# Patient Record
Sex: FEMALE | Race: WHITE
[De-identification: names, ages, dates, MRNs, and addresses within clinical notes are randomized per-mention and may not be internally consistent; named-entity substitution may affect disease eponyms.]

---

## 2018-03-30 ENCOUNTER — HOSPITAL ENCOUNTER (OUTPATIENT)
Dept: HOSPITAL 62 - LC | Age: 33
Discharge: HOME | End: 2018-03-30
Attending: OBSTETRICS & GYNECOLOGY
Payer: MEDICAID

## 2018-03-30 DIAGNOSIS — Z34.93: Primary | ICD-10-CM

## 2018-03-30 PROCEDURE — 4A1HXCZ MONITORING OF PRODUCTS OF CONCEPTION, CARDIAC RATE, EXTERNAL APPROACH: ICD-10-PCS | Performed by: OBSTETRICS & GYNECOLOGY

## 2018-03-30 PROCEDURE — 59025 FETAL NON-STRESS TEST: CPT

## 2018-03-30 NOTE — NON STRESS TEST REPORT
=================================================================

Non Stress Test

=================================================================

Datetime Report Generated by CPN: 03/30/2018 15:37

   

   

=================================================================

DEMOGRAPHIC

=================================================================

   

EGA NST:  40.3

   

=================================================================

INDICATION

=================================================================

   

Indication for Study:  Ordered by Provider

   

=================================================================

MONITORING

=================================================================

   

Monitor Explained:  Monitor Explained; Test Explained; Patient

   Verbalized Understanding

Time on Monitor:  03/30/2018 15:07

Time off Monitor:  03/30/2018 15:27

NST Duration:  20

   

=================================================================

NST INTERVENTIONS

=================================================================

   

NST Interventions:  None

Physician Notified NST:  Dr Infante

BABY A:  [id number]

   

=================================================================

BABY A

=================================================================

   

Fetal Movement :  Present

Contraction Frequency :  0

FHR Baseline :  130

Accelerations :  15X15

Decelerations :  None

Variability :  Moderate 6-25bpm

NST Review:  Meets Criteria for Reactive NST

NST Review and Verified By :  SERGO Aviles RN

NSVERÓNICA Results:  Reactive

   

=================================================================

NST REPORT

=================================================================

   

Report Trigger:  Send Report

## 2018-04-03 ENCOUNTER — HOSPITAL ENCOUNTER (INPATIENT)
Dept: HOSPITAL 62 - LR | Age: 33
LOS: 2 days | Discharge: HOME | End: 2018-04-05
Attending: OBSTETRICS & GYNECOLOGY | Admitting: OBSTETRICS & GYNECOLOGY
Payer: MEDICAID

## 2018-04-03 DIAGNOSIS — Z88.2: ICD-10-CM

## 2018-04-03 DIAGNOSIS — Z3A.41: ICD-10-CM

## 2018-04-03 DIAGNOSIS — O26.03: ICD-10-CM

## 2018-04-03 DIAGNOSIS — F32.9: ICD-10-CM

## 2018-04-03 DIAGNOSIS — O48.0: Primary | ICD-10-CM

## 2018-04-03 LAB
ADD MANUAL DIFF: NO
APPEARANCE UR: (no result)
APTT PPP: YELLOW S
BARBITURATES UR QL SCN: NEGATIVE
BASOPHILS # BLD AUTO: 0 10^3/UL (ref 0–0.2)
BASOPHILS NFR BLD AUTO: 0.5 % (ref 0–2)
BILIRUB UR QL STRIP: NEGATIVE
EOSINOPHIL # BLD AUTO: 0.2 10^3/UL (ref 0–0.6)
EOSINOPHIL NFR BLD AUTO: 2.9 % (ref 0–6)
ERYTHROCYTE [DISTWIDTH] IN BLOOD BY AUTOMATED COUNT: 14.4 % (ref 11.5–14)
GLUCOSE UR STRIP-MCNC: NEGATIVE MG/DL
HCT VFR BLD CALC: 34.1 % (ref 36–47)
HGB BLD-MCNC: 11.4 G/DL (ref 12–15.5)
KETONES UR STRIP-MCNC: NEGATIVE MG/DL
LYMPHOCYTES # BLD AUTO: 1.7 10^3/UL (ref 0.5–4.7)
LYMPHOCYTES NFR BLD AUTO: 21.8 % (ref 13–45)
MCH RBC QN AUTO: 29.3 PG (ref 27–33.4)
MCHC RBC AUTO-ENTMCNC: 33.5 G/DL (ref 32–36)
MCV RBC AUTO: 87 FL (ref 80–97)
METHADONE UR QL SCN: NEGATIVE
MONOCYTES # BLD AUTO: 0.7 10^3/UL (ref 0.1–1.4)
MONOCYTES NFR BLD AUTO: 8.6 % (ref 3–13)
NEUTROPHILS # BLD AUTO: 5.3 10^3/UL (ref 1.7–8.2)
NEUTS SEG NFR BLD AUTO: 66.2 % (ref 42–78)
NITRITE UR QL STRIP: NEGATIVE
PCP UR QL SCN: NEGATIVE
PH UR STRIP: 6 [PH] (ref 5–9)
PLATELET # BLD: 224 10^3/UL (ref 150–450)
PROT UR STRIP-MCNC: NEGATIVE MG/DL
RBC # BLD AUTO: 3.9 10^6/UL (ref 3.72–5.28)
SP GR UR STRIP: 1.01
TOTAL CELLS COUNTED % (AUTO): 100 %
URINE AMPHETAMINES SCREEN: NEGATIVE
URINE BENZODIAZEPINES SCREEN: NEGATIVE
URINE COCAINE SCREEN: NEGATIVE
URINE MARIJUANA (THC) SCREEN: NEGATIVE
UROBILINOGEN UR-MCNC: NEGATIVE MG/DL (ref ?–2)
WBC # BLD AUTO: 8 10^3/UL (ref 4–10.5)

## 2018-04-03 PROCEDURE — 85027 COMPLETE CBC AUTOMATED: CPT

## 2018-04-03 PROCEDURE — S0119 ONDANSETRON 4 MG: HCPCS

## 2018-04-03 PROCEDURE — 4A1HXCZ MONITORING OF PRODUCTS OF CONCEPTION, CARDIAC RATE, EXTERNAL APPROACH: ICD-10-PCS | Performed by: OBSTETRICS & GYNECOLOGY

## 2018-04-03 PROCEDURE — 86850 RBC ANTIBODY SCREEN: CPT

## 2018-04-03 PROCEDURE — 86900 BLOOD TYPING SEROLOGIC ABO: CPT

## 2018-04-03 PROCEDURE — 80307 DRUG TEST PRSMV CHEM ANLYZR: CPT

## 2018-04-03 PROCEDURE — 0HQ9XZZ REPAIR PERINEUM SKIN, EXTERNAL APPROACH: ICD-10-PCS | Performed by: OBSTETRICS & GYNECOLOGY

## 2018-04-03 PROCEDURE — 85025 COMPLETE CBC W/AUTO DIFF WBC: CPT

## 2018-04-03 PROCEDURE — 86592 SYPHILIS TEST NON-TREP QUAL: CPT

## 2018-04-03 PROCEDURE — 81005 URINALYSIS: CPT

## 2018-04-03 PROCEDURE — 36415 COLL VENOUS BLD VENIPUNCTURE: CPT

## 2018-04-03 PROCEDURE — 86901 BLOOD TYPING SEROLOGIC RH(D): CPT

## 2018-04-03 RX ADMIN — SODIUM CHLORIDE, SODIUM LACTATE, POTASSIUM CHLORIDE, AND CALCIUM CHLORIDE PRN ML: .6; .31; .03; .02 INJECTION, SOLUTION INTRAVENOUS at 06:54

## 2018-04-03 RX ADMIN — SODIUM CHLORIDE, SODIUM LACTATE, POTASSIUM CHLORIDE, AND CALCIUM CHLORIDE PRN ML: .6; .31; .03; .02 INJECTION, SOLUTION INTRAVENOUS at 16:53

## 2018-04-03 NOTE — ADMISSION PHYSICAL
=================================================================



=================================================================

Datetime Report Generated by CPN: 2018 07:49

   

   

=================================================================

CURRENT ADMISSION

=================================================================

   

Prenatal Hx Assessment:  The Prenatal History has been Reviewed and is

   Current

Chief Complaint:  Scheduled Induction of Labor

Indication for Induction:  Maternal Diabetes

Admit Impression :  Term, Intrauterine Pregnancy; Induction of Labor

Admit Plan:  Admit to Unit; Initiate Labor Protocol

   

=================================================================

ALLERGIES

=================================================================

   

Medication Allergies:  Yes

Medication Allergies:  Sulfa (Sulfonamide Antibiotics)/MO/Urticaria

   (2018)

Latex:  No Latex Allergies

Food Allergies:  N/A

Environmental Allergies:  N/A

   

=================================================================

OBSTETRICAL HISTORY

=================================================================

   

EDC:  2018 00:00

:  1

Para:  0

Term:  0

:  0

SAB:  0

IAB:  0

Ectopic:  0

Livin

Cesareans:  0

VBACs:  0

Multiple Births:  0

Gestational Diabetes:  No

Rh Sensitization:  No

Incompetent Cervix:  No

ALESSIA:  No

Infertility:  No

ART Treatment:  No

Uterine Anomaly:  No

IUGR:  No

Hx Previous C/S:  No

Macrosomia:  No

Hx Loss/Stillborn:  No

PIH:  No

Hx  Death:  No

Placenta Previa/Abruption:  No

Depression/PP Depression:  No

PTL/PROM:  No

Post Partum Hemorrhage:  No

Current Pregnancy Procedures:  Ultrasound; NST

Obstetrical History Comments:  G1- Current

   

=================================================================

***SEE PRENATAL RECORDS***

=================================================================

   

Alcohol:  No

Marijuana :  No

Cocaine:  No

Other Illicit Drugs:  No

Cigarettes:  Former Smoker. [id number]

   

=================================================================

MEDICAL HISTORY

=================================================================

   

Diabetes:  No

Blood Transfusion:  No

Pulmonary Disease (Asthma, TB):  No

Breast Disease:  No

Hypertension:  No

Gyn Surgery:  No

Heart Disease:  No

Hosp/Surgery:  No

Autoimmune Disorder:  No

Anesthetic Complications:  No

Kidney Disease:  No

Abnormal Pap Smear:  No

Neuro/Epilepsy:  No

Psychiatric Disorders:  Yes

Other Medical Diseases:  No

Hepatitis/Liver Disease:  No

Significant Family History:  No

Varicosities/Phlebitis:  No

Trauma/Violence :  No

Thyroid Dysfunction:  No

Medical History Comments:  Depression, social anxiety

   

=================================================================

INFECTIOUS HISTORY

=================================================================

   

Gonorrhea:  No

Genital Herpes:  No

Chlamydia:  No

Tuberculosis:  No

Syphilis:  No

Hepatitis:  No

HIV/AIDS Exposure:  No

Rash or Viral Illness:  No

HPV:  No

   

=================================================================

PHYSICAL EXAM

=================================================================

   

General:  Normal

HEENT:  Normal

Neurologic:  Normal

Thyroid:  Normal

Heart:  Normal

Lungs:  Normal

Breast:  Normal

Back:  Normal

Abdomen:  Normal

Genitourinary Exam:  Normal

Extremities:  Normal

DTRs:  Normal

Pelvic Type:  Adequate

Vital Signs:  Reviewed; Within Normal Limits

   

=================================================================

VAGINAL EXAM

=================================================================

   

Dilatation:  2

Effacement:  50

Station:  -3

   

=================================================================

MEMBRANES

=================================================================

   

Membranes:  Intact

   

=================================================================

FETUS A

=================================================================

   

EGA:  41.0

Monitoring:  External US

FHR- Baseline:  140

Variability:  Moderate 6-25bpm

Accelerations:  15X15

Decelerations:  None

FHR Category:  Category I

Fetal Presentation:  Vertex

   

=================================================================

PLANS FOR LABOR AND DELIVERY

=================================================================

   

Labor and Delivery:  None

Pain Management:  Epidural

Feeding Preference:  Breast

Benefit of Breast Feed Discussed:  Yes

Circumcision:  Yes

   

=================================================================

INFORMED CONSENT

=================================================================

   

Informed Consent Obtained:  Vaginal Delivery;  Section

   Delivery; Induction of Labor; Vacuum/Forceps Assist; Risks, Benefits

   and Alternatives Discussed

Signature:  Electronically signed by Magdaleno Nuno MD ([facility]) on

   4/3/2018 at 07:48  with User ID: [email or web address]

## 2018-04-03 NOTE — DELIVERY SUMMARY
=================================================================

Del Sum A-C

=================================================================

Datetime Report Generated by CPN: 2018 22:57

   

   

=================================================================

DELIVERY PERSONNEL

=================================================================

   

DELIVERY PERSONNEL:  [id number]

Delivery Doctor::  Anna Marie Jessica MD

Labor and Delivery Nurse::  Lucía Small RN

Labor and Delivery Nurse::  Amelia Anaya RN

Nursery Nurse::  Renate Daniel RN

Nursery Nurse::  TRU Sanchezub Cuate/CNA:  Herlinda Castle, ST

   

=================================================================

MATERNAL INFORMATION

=================================================================

   

Delivery Anesthesia:  Epidural

Medications After Delivery:  Pitocin Bolus-Please Comment; Pitocin Drip

   20 Units/1000ml NSS

Estimated  Blood Loss (ml):  150

Maternal Complications:  None

   

=================================================================

LABOR SUMMARY

=================================================================

   

EDC:  2018 00:00

No. Babies in Womb:  1

 Attempted:  No

Labor Anesthesia:  Epidural

   

=================================================================

LABOR INFORMATION

=================================================================

   

Reason for Induction:  Post Dates

Onset of Labor:  2018 09:30

Complete Dilatation:  2018 20:37

Oxytocin:  Induction

Group B Beta Strep:  Positive

Antibiotics # of Doses:  3

Antibiotics Time of Last Dose:  171

Name of Antibiotic Given:  PENICILLIN G

Steroids Given:  None

Reason Steroids Not Administered:  Not Applicable

   

=================================================================

MEMBRANES

=================================================================

   

Membranes Rupture Method:  Artificial

Rupture of Membranes:  2018 09:30

Length of Rupture (hr):  11.55

Amniotic Fluid Color:  Clear

Amniotic Fluid Amount:  Small

Amniotic Fluid Odor:  None

   

=================================================================

STAGES OF LABOR

=================================================================

   

Stage 1 hr:  11

Stage 1 min:  7

Stage 2 hr:  0

Stage 2 min:  26

Stage 3 hr:  0

Stage 3 min:  5

Total Time in Labor hr:  11

Total Time in Labor min:  38

   

=================================================================

VAGINAL DELIVERY

=================================================================

   

Episiotomy:  None

Laceration #1:  Perineal

Laceration Extension #1:  First Degree

Laceration Repair:  Yes

Laceration Repair Note:  2-0 chromic repair in normal fashion.

Sponge Count Correct:  N/A

Sharps Count Correct:  N/A

   

=================================================================

CSECTION DELIVERY

=================================================================

   

Primary Indication:  N/A

Secondary Indication:  N/A

CSection Incidence:  N/A

Labor:  N/A

Elective:  N/A

CSection Incision:  N/A

   

=================================================================

BABY A INFORMATION

=================================================================

   

Infant Delivery Date/Time:  2018 21:03

Method of Delivery:  Vaginal

Born in Route :  No

:  N/A

Forceps:  N/A

Vacuum Extraction:  N/A

Shoulder Dystocia :  No

   

=================================================================

PRESENTATION/POSITION BABY A

=================================================================

   

Presentation:  Cephalic

Cephalic Presentation:  Vertex

Vertex Position:  Left Occipital Anterior

Breech Presentation:  N/A

   

=================================================================

PLACENTA INFORMATION BABY A

=================================================================

   

Placenta Delivery Time :  2018 21:08

Placenta Method of Delivery:  Spontaneous

Placenta Status:  Delivered

   

=================================================================

APGAR SCORES BABY A

=================================================================

   

Heart Rate 1 min:  >100 bpm

Resp Effort 1 min:  Good Cry

Reflex Irritability 1 min:  Cough or Sneeze or Pulls Away

Muscle Tone 1 min:  Active Motion

Color 1 min:  Blue/Pale

APGAR SCORE 1 MIN:  8

Heart Rate 5 min:  >100 bpm

Resp Effort 5 min:  Good Cry

Reflex Irritability 5 min:  Cough or Sneeze or Pulls Away

Muscle Tone 5 min:  Active Motion

Color 5 min:  Body Pink, Extremities Blue

APGAR SCORE 5 MIN:  9

   

=================================================================

INFANT INFORMATION BABY A

=================================================================

   

Gestational Age at Delivery:  41.0

Gestational Status:  Late Term-  41- 41.6 Weeks

Infant Outcome :  Liveborn

Infant Condition :  Stable

Infant Sex:  Male

   

=================================================================

IDENTIFICATION BABY A

=================================================================

   

Infant Verification Date/Time:  2018 21:21

ID Band Number:  [id number]

Mother's Name Verified:  Yes

Infant Medical Record Number:  [id number]

RN Verifying Infant:  K Jaclyn RN

Additional Verifying Personnel:  D Isabela US

   

=================================================================

WEIGHT/LENGTH BABY A

=================================================================

   

Infant Birthweight (gm):  4110

Infant Weight (lb):  9

Infant Weight (oz):  1

Infant Length (in):  20.50

Infant Length (cm):  52.07

   

=================================================================

CORD INFORMATION BABY A

=================================================================

   

No. Cord Vessels:  3

Nuchal Cord :  Around Neck x1, Loose

Nuchal Cord- Other:  L compound hand

Cord Blood Taken:  Yes-For Storage (Mom's Blood type +)

Infant Suction:  Mouth

   

=================================================================

ASSESSMENT BABY A

=================================================================

   

Infant Complications:  Multiple Variable Decels

Physical Findings at Delivery:  Molding of the Head

Infant Respirations:  Appears Normal

Skin to Skin:  Yes

Neonatologist/ALS Called :  No

Infant Care By:  KRYSTIN Kay RN, RN

Transferred To:  Remains with Mother

   

=================================================================

BABY B INFORMATION

=================================================================

   

 :  N/A

   

=================================================================

SIGNATURES

=================================================================

   

Signature:  Electronically signed by Anna Marie Jessica MD ([facility]) on

   4/3/2018 at 21:17  with User ID: [email or web address]

## 2018-04-03 NOTE — L&D PROGRESS NOTES
=================================================================

PROGRESS NOTES

=================================================================

Datetime Report Generated by CPN: 04/03/2018 15:52

   

   

=================================================================

PROGRESS NOTE

=================================================================

   

Impression:  Normal Progression of Labor

Plan:  Continue Present Management

Informed Consent Obtained:  Induction of Labor; Risks, Benefits and

   Alternatives Discussed

Vital Signs :  Reviewed; Within Normal Limits

Comment:  S: reports complete relief of pain with epidural,no concerns

   at this time

      

   O: VSS, cervix as stated, fetal head slightly asynclitic, pit @

   16mu/min, tracing as stated

      

   A: IUP @ 41w IOL-progressing, IUPC _ FSE inserted without

   difficulty. 

      

   P: stop pitocin for 30min due to late decels and multiple doses of

   ephedrine after epidural placement. Will re-start and reassess as

   clinically indicated or earlier prn.

   

=================================================================

VAGINAL EXAM

=================================================================

   

Dilatation:  6

Effacement:  70

Station:  -1

Contractions:  irregular

   

=================================================================

FETUS A

=================================================================

   

FHR - Baseline:  135

Monitoring:  External US

Variability:  Moderate 6-25bpm

Decelerations:  Late

   

=================================================================

FETUS C

=================================================================

   

SIGNATURE:  [id number];[id number];[id number]

Assignment:  Anna Marie Jessica MD

Signature:  Electronically signed by Adeal Munoz CNM on 4/3/2018

   at 15:51  with User ID: [email or web address]

:  Electronically signed by Adela Munoz CNM on 4/3/2018 at 15:51

   with User ID: [email or web address]

## 2018-04-03 NOTE — WARNING SIGNS IN BABIES
=================================================================

VOD Warning Signs

=================================================================

Datetime Report Generated by [facility]: 04/03/2018 23:01

   

VOD#608 -Warning Signs in Babies:  Viewed with Parent(s)/Family   

   (03/30/2018 15:17:Lucía Small RN)

## 2018-04-03 NOTE — L&D PROGRESS NOTES
=================================================================

PROGRESS NOTES

=================================================================

Datetime Report Generated by CPN: 2018 09:43

   

   

=================================================================

PROGRESS NOTE

=================================================================

   

Impression Other:  IUP @ 41w-IOL

Impression Other:  IUP @ 41w-IOL

Procedures:  Artificial ROM; Sterile Vag Exam

Procedures:  Artificial ROM; Sterile Vag Exam

Plan:  Continue Present Management; Induction

Plan:  Continue Present Management

Informed Consent Obtained:  Vaginal Delivery; Induction of Labor;

   Risks, Benefits and Alternatives Discussed

Informed Consent Obtained:  Vaginal Delivery; Induction of Labor;

   Risks, Benefits and Alternatives Discussed

Informed Consent Obtained:  Vaginal Delivery;  Section

   Delivery; Induction of Labor; Vacuum/Forceps Assist; Risks, Benefits

   and Alternatives Discussed

Vital Signs :  Reviewed; Within Normal Limits

Vital Signs :  Reviewed; Within Normal Limits

Comment:  S: reports increased pain with contractions,desires epidural

   for pain control at some point 

      

   O: VSS, cervix as stated, pit @ 18mu/min

      

   A: IUP @ 41w- IOL-stable AROM-clear fluid

      

   P: continue IOL with pitocin, reassess as clinically indicated or

   with epidural placement. Pt. and  asked questions and

   verbalized understanding.

   

=================================================================

VAGINAL EXAM

=================================================================

   

Dilatation:  4

Dilatation:  4

Dilatation:  2

Effacement:  70

Effacement:  70

Effacement:  50

Station:  -2

Station:  -2

Station:  -3

Contractions:  1.5-2.5

   

=================================================================

MEMBRANES

=================================================================

   

Membranes:  Ruptured

Membranes:  Intact

Amniotic Fluid Color:  Clear

   

=================================================================

FETUS A

=================================================================

   

FHR - Baseline:  140

Monitoring:  External US

Variability:  Moderate 6-25bpm

Accelerations:  15X15

:  41.0

Fetal Presentation:  Vertex

   

=================================================================

SIGNATURE

=================================================================

   

SIGNATURE:  [id number];14,[id number];13,[id number]

SIGNATURE:  13,[id number];14,[id number]

SIGNATURE:  [id number]

Assignment:  Anna Marie Jessica MD

Signature:  Electronically signed by Adela Munoz CNM on 4/3/2018

   at 09:42  with User ID: [email or web address]

:  Electronically signed by Adela Munoz CNM on 4/3/2018 at 09:42

   with User ID: [email or web address]

## 2018-04-04 LAB
ERYTHROCYTE [DISTWIDTH] IN BLOOD BY AUTOMATED COUNT: 15.1 % (ref 11.5–14)
HCT VFR BLD CALC: 31.2 % (ref 36–47)
HGB BLD-MCNC: 10.4 G/DL (ref 12–15.5)
MCH RBC QN AUTO: 28.9 PG (ref 27–33.4)
MCHC RBC AUTO-ENTMCNC: 33.2 G/DL (ref 32–36)
MCV RBC AUTO: 87 FL (ref 80–97)
PLATELET # BLD: 192 10^3/UL (ref 150–450)
RBC # BLD AUTO: 3.58 10^6/UL (ref 3.72–5.28)
WBC # BLD AUTO: 17.3 10^3/UL (ref 4–10.5)

## 2018-04-04 RX ADMIN — FAMOTIDINE SCH MG: 20 TABLET, FILM COATED ORAL at 00:32

## 2018-04-04 RX ADMIN — FAMOTIDINE SCH MG: 20 TABLET, FILM COATED ORAL at 09:51

## 2018-04-04 RX ADMIN — FERROUS SULFATE TAB 325 MG (65 MG ELEMENTAL FE) SCH MG: 325 (65 FE) TAB at 09:50

## 2018-04-04 RX ADMIN — IBUPROFEN SCH MG: 800 TABLET, FILM COATED ORAL at 00:32

## 2018-04-04 RX ADMIN — FAMOTIDINE SCH MG: 20 TABLET, FILM COATED ORAL at 21:51

## 2018-04-04 RX ADMIN — FERROUS SULFATE TAB 325 MG (65 MG ELEMENTAL FE) SCH MG: 325 (65 FE) TAB at 17:10

## 2018-04-04 RX ADMIN — Medication SCH CAP: at 09:52

## 2018-04-04 RX ADMIN — SENNOSIDES, DOCUSATE SODIUM SCH EACH: 50; 8.6 TABLET, FILM COATED ORAL at 09:50

## 2018-04-04 RX ADMIN — IBUPROFEN SCH MG: 800 TABLET, FILM COATED ORAL at 05:16

## 2018-04-04 RX ADMIN — IBUPROFEN SCH MG: 800 TABLET, FILM COATED ORAL at 21:51

## 2018-04-04 RX ADMIN — DOCUSATE SODIUM SCH MG: 100 CAPSULE, LIQUID FILLED ORAL at 17:10

## 2018-04-04 RX ADMIN — IBUPROFEN SCH MG: 800 TABLET, FILM COATED ORAL at 13:54

## 2018-04-04 NOTE — PDOC PROGRESS REPORT
Subjective-OB


Progress Note for:: 04/04/18


Subjective: 





pt sleeping


offers no complaints


ff@u-2 mild lochia


breastfeeding without complaints


anticipate d/c in AM





Physical Exam (OB)


Vital Signs: 


 











Temp Pulse Resp BP Pulse Ox


 


 98.2 F   76   16   114/76   99 


 


 04/04/18 08:25  04/04/18 08:25  04/04/18 08:25  04/04/18 08:25  04/04/18 08:25








 Intake & Output











 04/03/18 04/04/18 04/05/18





 06:59 06:59 06:59


 


Weight  105.8 kg 














- PIH/Pre-Eclampsia


Clonus: Negative


Headache: Absent


Epigastric Pain: No


Visual Changes: No





- Lochia


Lochia Amount: Moderate 25-50 ml


Lochia Color: Rubra/Red





- Abdomen


Description: Soft, Round


Fundal Description: Firm, Non-Midline


Fundal Height: u/u - u/2





Objective-Diagnostic


Laboratory: 


 





 04/04/18 07:07 





 











  04/04/18





  07:07


 


WBC  17.3 H D


 


RBC  3.58 L


 


Hgb  10.4 L


 


Hct  31.2 L


 


MCV  87


 


MCH  28.9


 


MCHC  33.2


 


RDW  15.1 H


 


Plt Count  192

## 2018-04-05 VITALS — SYSTOLIC BLOOD PRESSURE: 111 MMHG | DIASTOLIC BLOOD PRESSURE: 65 MMHG

## 2018-04-05 LAB
ERYTHROCYTE [DISTWIDTH] IN BLOOD BY AUTOMATED COUNT: 15 % (ref 11.5–14)
HCT VFR BLD CALC: 32.5 % (ref 36–47)
HGB BLD-MCNC: 10.6 G/DL (ref 12–15.5)
MCH RBC QN AUTO: 28.8 PG (ref 27–33.4)
MCHC RBC AUTO-ENTMCNC: 32.6 G/DL (ref 32–36)
MCV RBC AUTO: 89 FL (ref 80–97)
PLATELET # BLD: 231 10^3/UL (ref 150–450)
RBC # BLD AUTO: 3.67 10^6/UL (ref 3.72–5.28)
WBC # BLD AUTO: 11.3 10^3/UL (ref 4–10.5)

## 2018-04-05 RX ADMIN — FAMOTIDINE SCH MG: 20 TABLET, FILM COATED ORAL at 09:55

## 2018-04-05 RX ADMIN — DOCUSATE SODIUM SCH MG: 100 CAPSULE, LIQUID FILLED ORAL at 09:56

## 2018-04-05 RX ADMIN — IBUPROFEN SCH MG: 800 TABLET, FILM COATED ORAL at 14:04

## 2018-04-05 RX ADMIN — Medication SCH CAP: at 09:54

## 2018-04-05 RX ADMIN — FERROUS SULFATE TAB 325 MG (65 MG ELEMENTAL FE) SCH MG: 325 (65 FE) TAB at 09:54

## 2018-04-05 RX ADMIN — SENNOSIDES, DOCUSATE SODIUM SCH EACH: 50; 8.6 TABLET, FILM COATED ORAL at 09:55

## 2018-04-05 RX ADMIN — IBUPROFEN SCH MG: 800 TABLET, FILM COATED ORAL at 05:33

## 2018-04-05 NOTE — PDOC PROGRESS REPORT
Subjective-OB


Progress Note for:: 04/05/18


Subjective: 





Ready to go home.





Physical Exam (OB)


Vital Signs: 


 











Temp Pulse Resp BP Pulse Ox


 


 98.1 F   62   15   111/65   99 


 


 04/05/18 08:18  04/05/18 08:18  04/05/18 08:18  04/05/18 08:18  04/05/18 08:18








 Intake & Output











 04/04/18 04/05/18 04/06/18





 06:59 06:59 06:59


 


Weight 105.8 kg  














- PIH/Pre-Eclampsia


Clonus: Negative


Headache: Absent


Epigastric Pain: No


Visual Changes: No





- Lochia


Lochia Amount: Small 10-25 ml


Lochia Color: Rubra/Red





- Abdomen


Description: Soft, Round


Hernia Present: No


Bowel Sounds: Normoactive


Flatus Presence: Present


Stool: No


Fundal Description: Firm, Midline


Fundal Height: u/u - u/2





Objective-Diagnostic


Laboratory: 


 





 04/04/18 07:07

## 2018-04-05 NOTE — PDOC DISCHARGE SUMMARY
Final Diagnosis


Discharge Date: 18





- Final Diagnosis


(1) Delivery normal


Is this a current diagnosis for this admission?: Yes   





(2) Depression


Is this a current diagnosis for this admission?: Yes   





(3) Excessive weight gain in pregnancy


Is this a current diagnosis for this admission?: Yes   





(4) Positive GBS test


Is this a current diagnosis for this admission?: Yes   





(5) Pregnancy


Is this a current diagnosis for this admission?: Yes   





Discharge Data





- Discharge Medication


Prescriptions: 


Docusate Sodium [Colace 100 mg Capsule] 100 mg PO BID #30 capsule


Ferrous Sulfate [Feosol 325 mg Tablet] 325 mg PO BID #60 tablet


Home Medications: 








Prenatal Vit/Iron Fum/Folic AC [Prenatal Tablet] 1 each PO DAILY 18 


Docusate Sodium [Colace 100 mg Capsule] 100 mg PO BID #30 capsule 18 


Ferrous Sulfate [Feosol 325 mg Tablet] 325 mg PO BID #60 tablet 18 








Gestational Age: 41 wks


Reason(s) for Admission: Induction of Labor, Gestional Diabetes


Prenatal Procedures: NST, Ultrasound


Intrapartum Procedure(s): Spontaneous Vaginal Delivery


Postpartum Complication(s): Laceration-Perineal


Laceration-Degree: 1st





- [place] Data


  ** Baby 1 Male


Apgar at 1 minute: 8


Apgar at 5 minutes: 9


Weight: 4.111 kg





- Diagnosis Test


Laboratory: 


 











Temp Pulse Resp BP Pulse Ox


 


 98.1 F   62   15   111/65   99 


 


 18 08:18  18 08:18  18 08:18  18 08:18  18 08:18








 











  18





  06:21 06:36 07:07


 


RBC   3.90  3.58 L


 


Hgb   11.4 L  10.4 L


 


Hct   34.1 L  31.2 L


 


Urine Opiates Screen  NEGATIVE  














- Discharge information/Instructions


Discharge Activity: Activity As Tolerated, Balance Activity w/Rest, Pelvic Rest

, Slowly Increase Activity, No tub bath


Discharge Diet: Regular


Disposition: HOME, SELF-CARE


Follow up with: [facility]


in: 4, Weeks

## 2019-11-13 ENCOUNTER — HOSPITAL ENCOUNTER (EMERGENCY)
Dept: HOSPITAL 62 - ER | Age: 34
Discharge: HOME | End: 2019-11-13
Payer: COMMERCIAL

## 2019-11-13 VITALS — DIASTOLIC BLOOD PRESSURE: 63 MMHG | SYSTOLIC BLOOD PRESSURE: 104 MMHG

## 2019-11-13 DIAGNOSIS — O26.893: Primary | ICD-10-CM

## 2019-11-13 DIAGNOSIS — M79.604: ICD-10-CM

## 2019-11-13 DIAGNOSIS — Z3A.28: ICD-10-CM

## 2019-11-13 DIAGNOSIS — R20.0: ICD-10-CM

## 2019-11-13 DIAGNOSIS — M54.41: ICD-10-CM

## 2019-11-13 PROCEDURE — 93971 EXTREMITY STUDY: CPT

## 2019-11-13 NOTE — ER DOCUMENT REPORT
ED General





- General


Chief Complaint: Leg Pain


Stated Complaint: NUMBNESS IN LEG


Time Seen by Provider: 11/13/19 18:21


Primary Care Provider: 


JUSTIN DOUGLASS CNM [ALLIED HEALTH PROFESSIONAL] - Follow up as needed


Mode of Arrival: Ambulatory


TRAVEL OUTSIDE OF THE [place] IN LAST 30 DAYS: No





- HPI


Notes: 





Patient is a 34-year-old female who is proximate 28 weeks pregnant who presents 

complaining of right low back pain that radiates down into her right medial leg 

down into her foot for the past 3 days.  Patient states that movement does make 

her pain worse.  Patient states that she does have some varicose veins and her 

family doctor wanted an ultrasound performed to make sure there is no blood 

clot.  She otherwise has no other concerns or complaints.  She is able to eat 

and drink without difficulty.  She is urinating normally and having normal bowel

movements.  No vaginal discharge, odor, or bleeding.  No history of spinal 

abscess, diabetes, IV drug abuse.  Denies any headache, fever, neck pain, URI, 

sore throat, chest pain, palpitations, syncope, cough, shortness of breath, 

wheeze, dyspnea, abdominal pain, nausea/vomiting/diarrhea, urinary retention, 

dysuria, hematuria, loss of control of bowel or bladder, numbness/tingling, 

saddle anesthesia, muscle paralysis/weakness, or rash.





- Related Data


Allergies/Adverse Reactions: 


                                        





Sulfa (Sulfonamide Antibiotics) Allergy (Intermediate, Verified 04/03/18 07:03)


   Urticaria








Home Medications: tylenol.  prenatal vitamin





Past Medical History





- General


Information source: Patient





- Social History


Smoking Status: Never Smoker


Chew tobacco use (# tins/day): No


Frequency of alcohol use: None


Drug Abuse: None


Family History: Reviewed & Not Pertinent


Patient has suicidal ideation: No


Patient has homicidal ideation: No





- Past Medical History


Cardiac Medical History: 


   Denies: Hx Hypertension, Hx Pulmonary Embolism, Hx Heart Murmur


Pulmonary Medical History: 


   Denies: Hx Asthma, Hx Sleep Apnea, Hx Tuberculosis


Neurological Medical History: Denies: Hx Cerebrovascular Accident, Hx Seizures


Endocrine Medical History: Denies: Hx Hyperthyroidism, Hx Hypothyroidism


Renal/ Medical History: Denies: Hx Kidney Stones, Hx Ovarian Cysts, Hx Pelvic 

Inflammatory Disease


Malignancy Medical History: Denies: Hx Breast Cancer, Hx Cervical Cancer, Hx 

Ovarian Cancer


GI Medical History: Denies: Hx Gastroesophageal Reflux Disease, Hx Hiatal 

Hernia, Hx Ulcer


Musculoskeletal Medical History: Denies Hx Fibromyalgia


Psychiatric Medical History: Reports: Hx Depression


   Denies: Hx Bipolar Disorder, Hx Post Traumatic Stress Disorder, Hx 

Schizophrenia


Traumatic Medical History: Denies: Hx Fractures


Infectious Medical History: Denies: Hx HIV





Review of Systems





- Review of Systems


-: Yes All other systems reviewed and negative





Physical Exam





- Vital signs


Vitals: 


                                        











Temp Pulse Resp BP Pulse Ox


 


 98.0 F   79   20   116/69   100 


 


 11/13/19 18:11  11/13/19 18:11  11/13/19 18:11  11/13/19 18:11  11/13/19 18:11














- Notes


Notes: 





PHYSICAL EXAMINATION:





GENERAL: Well-appearing, well-nourished and in no acute distress. 





LUNGS: Breath sounds clear to auscultation bilaterally and equal.  No wheezes 

rales or rhonchi.





HEART: Regular rate and rhythm without murmurs, rubs, gallops.





ABDOMEN: Soft, nontender, nondistended abdomen.  No guarding, no rebound.  

Normal bowel sounds present.  No CVA tenderness bilaterally. 





Musculoskeletal: LE's b/l:  FROM to passive/active. Strength 5+/5.  No deficits 

noted.  No bony tenderness of extremities.





Back:  FROM to passive/active.  Strength 5+/5.  No vertebral point tenderness, 

stepoffs, or deformities.  No other bony tenderness, erythema, swelling, or 

ecchymosis.  SLR negative b/l.  + mild tenderness to the Rt L-paraspinal mm.  

Mild spasming.  + mild rt SI jt tenderness.  No foot drop





Extremities:  No cyanosis, clubbing, or edema b/l.  Peripheral pulses 2+.  

Capillary refill less than 2 seconds.  No calf tenderness or LE asymmetry.  





NEUROLOGICAL: Normal speech, normal gait.  Normal sensory, motor exams.  

Reflexes 2+ b/l.  





PSYCH: Normal mood, normal affect.





SKIN: Warm, Dry, normal turgor, no rashes or lesions noted.





Course





- Re-evaluation


Re-evalutation: 





11/13/19 21:33


Patient is an afebrile, well-hydrated, 34-year-old female who presents to the ED

with Rt low back pain with probable radiculitis RLE.  Vitals are acceptable.  PE

is otherwise unremarkable for any focal neurological deficits.  Pt given 

tylenol.  She has no significant tachycardia, tachypnea, or hypoxia.  She is 

nontoxic-appearing and is tolerating p.o. without difficulties.  There are no 

signs of infection.  No other red flag symptoms noted.  Doppler negative for 

DVT.  No other labs or imaging warranted at this time based on H&P.  Low 

suspicion for any meningitis, fracture, expanding/ruptured AAA, cauda equina 

syndrome, epidural mass lesion/abscess, herniated disc causing severe spinal 

stenosis, or other systemic infection at this time.  Patient is aware that this 

condition can change from initial presentation and that she needs monitor 

symptoms closely for any acute changes. Conservative measures otherwise for symp

toms.  Recheck with your PCM in 3-5 days.  Consider consult with 

orthopedic/physical therapy.  Return to the ED with any worsening/concerning 

symptoms otherwise as reviewed discharge.  Patient is in agreement.





- Vital Signs


Vital signs: 


                                        











Temp Pulse Resp BP Pulse Ox


 


 98.0 F   79   20   116/69   100 


 


 11/13/19 18:11  11/13/19 18:11  11/13/19 18:11  11/13/19 18:11  11/13/19 18:11














Discharge





- Discharge


Clinical Impression: 


 Right leg pain





Right low back pain


Qualifiers:


 Chronicity: acute Sciatica presence: with sciatica Sciatica laterality: 

sciatica of right side Qualified Code(s): M54.41 - Lumbago with sciatica, right 

side





Condition: Stable


Disposition: HOME, SELF-CARE


Additional Instructions: 


Rest, Ice


Tylenol as needed


Light stretches daily


Strength exercises as able


Moist heat and massage may help


F/u with your PCP in 3-5 days for a recheck


Consider consult(s) with Orthopedics/physical therapy for ongoing/worsening 

symptoms





Return to the ED with any worsening symptoms and/or development of fever, 

headache, chest pain, palpitations, syncope, shortness of breath, trouble 

breathing, abdominal pain, n/v/d, blood in stool/urine, loss of control of 

bowel/bladder, urinary retention, muscle weakness/paralysis, saddle anesthesia, 

numbness/tingling, or other worsening symptoms that are concerning to you.


Referrals: 


JUSTIN DOUGLASS, CNM [ALLIED HEALTH PROFESSIONAL] - Follow up as needed


ALONZO GAFFNEY JR, DO [ACTIVE PROVISIONAL STAFF] - Follow up as needed

## 2019-11-13 NOTE — RADIOLOGY REPORT (SQ)
EXAM DESCRIPTION: 



US EXTREMITY VEINS UNILATERAL



COMPLETED DATE/TME:  11/13/2019 18:23



CLINICAL HISTORY: 



34 years, Female, Pregnant right leg pain



COMPARISON:

None.



EXAM DESCRIPTION:



CLINICAL HISTORY:  34 years  Female  Pregnant right leg pain



COMPARISON:  None.



TECHNIQUE:  Duplex and color Doppler imaging performed to

evaluate the extremity deep venous structures. Compression

imaging and augmentation imaging performed. 



FINDINGS: Increased echogenicity of blood within the common

femoral vein suggests hemoconcentration / sluggish flow; clinical

correlation will be helpful.



No thrombus is identified in the deep venous structures imaged.



There is normal flow, compressibility, and augmentation

throughout.



IMPRESSION:



No DVT is identified.

## 2019-11-13 NOTE — ER DOCUMENT REPORT
ED Medical Screen (RME)





- General


Chief Complaint: Numbness


Stated Complaint: NUMBNESS IN LEG


Time Seen by Provider: 11/13/19 18:21


Primary Care Provider: 


JUSTIN DOUGLASS CNM [Primary Care Provider] - Follow up as needed


Mode of Arrival: Ambulatory


Information source: Patient


Notes: 





34-year-old female approximately 20 weeks pregnant presents to the emergency 

department with right leg pain.  Reports her provider did schedule her Doppler 

next week but the pain is increasing.  Denies recent trip.  Denies history of 

DVTs.  Reports her knee is swelling.








I have greeted and performed a rapid initial assessment of this patient.  A 

comprehensive ED assessment and evaluation of the patient, analysis of test 

results and completion of the medical decision making process will be conducted 

by additional ED providers.  Dictation of this chart was performed using voice 

recognition software; therefore, there may be some unintended grammatical 

errors.


TRAVEL OUTSIDE OF THE [place] IN LAST 30 DAYS: No





- Related Data


Allergies/Adverse Reactions: 


                                        





Sulfa (Sulfonamide Antibiotics) Allergy (Intermediate, Verified 04/03/18 07:03)


   Urticaria











Past Medical History





- Past Medical History


Cardiac Medical History: 


   Denies: Hx Hypertension, Hx Pulmonary Embolism, Hx Heart Murmur


Pulmonary Medical History: 


   Denies: Hx Asthma, Hx Sleep Apnea, Hx Tuberculosis


Neurological Medical History: Denies: Hx Cerebrovascular Accident, Hx Seizures


Endocrine Medical History: Denies: Hx Hyperthyroidism, Hx Hypothyroidism


Renal/ Medical History: Denies: Hx Kidney Stones, Hx Ovarian Cysts, Hx Pelvic 

Inflammatory Disease


Malignancy Medical History: Denies: Hx Breast Cancer, Hx Cervical Cancer, Hx 

Ovarian Cancer


GI Medical History: Denies: Hx Gastroesophageal Reflux Disease, Hx Hiatal 

Hernia, Hx Ulcer


Musculoskeltal Medical History: Denies Hx Fibromyalgia


Psychiatric Medical History: Reports: Hx Depression


   Denies: Hx Bipolar Disorder, Hx Post Traumatic Stress Disorder, Hx 

Schizophrenia


Traumatic Medical History: Denies: Hx Fractures


Infectious Medical History: Denies: Hx HIV





Physical Exam





- Vital signs


Vitals: 





                                        











Temp Pulse Resp BP Pulse Ox


 


 98.0 F   79   20   116/69   100 


 


 11/13/19 18:11  11/13/19 18:11  11/13/19 18:11  11/13/19 18:11  11/13/19 18:11














Course





- Vital Signs


Vital signs: 





                                        











Temp Pulse Resp BP Pulse Ox


 


 98.0 F   79   20   116/69   100 


 


 11/13/19 18:11  11/13/19 18:11  11/13/19 18:11  11/13/19 18:11  11/13/19 18:11














Doctor's Discharge





- Discharge


Referrals: 


JUSTIN DOUGLASS CNM [Primary Care Provider] - Follow up as needed

## 2019-11-20 ENCOUNTER — HOSPITAL ENCOUNTER (OUTPATIENT)
Dept: HOSPITAL 62 - SP | Age: 34
End: 2019-11-20
Attending: ADVANCED PRACTICE MIDWIFE
Payer: COMMERCIAL

## 2019-11-20 DIAGNOSIS — I83.813: Primary | ICD-10-CM

## 2019-11-20 PROCEDURE — 93970 EXTREMITY STUDY: CPT

## 2019-11-20 NOTE — RADIOLOGY REPORT (SQ)
EXAM DESCRIPTION:  VENOUS BILATERAL LOWER



COMPLETED DATE/TIME:  11/20/2019 8:56 am



REASON FOR STUDY:  PAIN IN LEGS I83.813  VARICOSE VEINS OF BILATERAL LOWER EXTREMITIES WITH P



COMPARISON:  None.



TECHNIQUE:  Dynamic and static gray scale and color images acquired of both lower extremity venous sy
stems. Selected spectral images acquired with additional compression and augmentation maneuvers. Imag
es stored on PACS.



LIMITATIONS:  None.



FINDINGS:  RIGHT LEG

COMMON FEMORAL AND FEMORAL: Normal phasicity, compression and augmentation. No visualized echogenic m
aterial on gray scale. No defects on color images.

POPLITEAL: Normal compression and augmentation. No visualized echogenic material on gray scale. No de
fects on color images.

CALF VESSELS: Normal compression and augmentation. No visualized echogenic material on gray scale. No
 defects on color image.

GSV AND SSV: Normal compression. No visualized echogenic material on gray scale. No defects on color 
images.

ANY DEEP VENOUS INSUFFICIENCY: No reflux on Valsalva.

ANY EVIDENCE OF POPLITEAL CYST: No.

OTHER: No other significant finding.

LEFT LEG

COMMON FEMORAL AND FEMORAL: Normal phasicity, compression and augmentation. No visualized echogenic m
aterial on gray scale. No defects on color images.

POPLITEAL: Normal compression and augmentation. No visualized echogenic material on gray scale. No de
fects on color images.

CALF VESSELS: Normal compression and augmentation. No visualized echogenic material on gray scale. No
 defects on color images.

GSV AND SSV: Normal compression. No visualized echogenic material on gray scale. No defects on color 
images.

ANY DEEP VENOUS INSUFFICIENCY: No reflux on Valsalva.

ANY EVIDENCE POPLITEAL CYST: No.

OTHER: No other significant finding.



IMPRESSION:  NO EVIDENCE DVT OR SVT IN EITHER LEG.



TECHNICAL DOCUMENTATION:  JOB ID:  [id number]

 2011 [employer]- All Rights Reserved



Reading location - IP/workstation name: [email or web address]

## 2020-01-29 ENCOUNTER — HOSPITAL ENCOUNTER (INPATIENT)
Dept: HOSPITAL 62 - LC | Age: 35
LOS: 3 days | Discharge: HOME | End: 2020-02-01
Attending: OBSTETRICS & GYNECOLOGY | Admitting: OBSTETRICS & GYNECOLOGY
Payer: COMMERCIAL

## 2020-01-29 DIAGNOSIS — O45.8X3: ICD-10-CM

## 2020-01-29 DIAGNOSIS — Z3A.39: ICD-10-CM

## 2020-01-29 DIAGNOSIS — F32.9: ICD-10-CM

## 2020-01-29 DIAGNOSIS — O26.03: ICD-10-CM

## 2020-01-29 PROCEDURE — 86850 RBC ANTIBODY SCREEN: CPT

## 2020-01-29 PROCEDURE — 80307 DRUG TEST PRSMV CHEM ANLYZR: CPT

## 2020-01-29 PROCEDURE — 86900 BLOOD TYPING SEROLOGIC ABO: CPT

## 2020-01-29 PROCEDURE — 36415 COLL VENOUS BLD VENIPUNCTURE: CPT

## 2020-01-29 PROCEDURE — 86592 SYPHILIS TEST NON-TREP QUAL: CPT

## 2020-01-29 PROCEDURE — 85025 COMPLETE CBC W/AUTO DIFF WBC: CPT

## 2020-01-29 PROCEDURE — 84112 EVAL AMNIOTIC FLUID PROTEIN: CPT

## 2020-01-29 PROCEDURE — 86901 BLOOD TYPING SEROLOGIC RH(D): CPT

## 2020-01-29 PROCEDURE — 81005 URINALYSIS: CPT

## 2020-01-30 LAB
ADD MANUAL DIFF: NO
APPEARANCE UR: CLEAR
APTT PPP: (no result) S
BARBITURATES UR QL SCN: NEGATIVE
BASOPHILS # BLD AUTO: 0 10^3/UL (ref 0–0.2)
BASOPHILS NFR BLD AUTO: 0.3 % (ref 0–2)
BILIRUB UR QL STRIP: NEGATIVE
EOSINOPHIL # BLD AUTO: 0.2 10^3/UL (ref 0–0.6)
EOSINOPHIL NFR BLD AUTO: 2.7 % (ref 0–6)
ERYTHROCYTE [DISTWIDTH] IN BLOOD BY AUTOMATED COUNT: 14.6 % (ref 11.5–14)
GLUCOSE UR STRIP-MCNC: NEGATIVE MG/DL
HCT VFR BLD CALC: 31.7 % (ref 36–47)
HGB BLD-MCNC: 10.5 G/DL (ref 12–15.5)
KETONES UR STRIP-MCNC: NEGATIVE MG/DL
LYMPHOCYTES # BLD AUTO: 1.4 10^3/UL (ref 0.5–4.7)
LYMPHOCYTES NFR BLD AUTO: 15.7 % (ref 13–45)
MCH RBC QN AUTO: 27 PG (ref 27–33.4)
MCHC RBC AUTO-ENTMCNC: 33.1 G/DL (ref 32–36)
MCV RBC AUTO: 82 FL (ref 80–97)
METHADONE UR QL SCN: NEGATIVE
MONOCYTES # BLD AUTO: 0.9 10^3/UL (ref 0.1–1.4)
MONOCYTES NFR BLD AUTO: 10 % (ref 3–13)
NEUTROPHILS # BLD AUTO: 6.2 10^3/UL (ref 1.7–8.2)
NEUTS SEG NFR BLD AUTO: 71.3 % (ref 42–78)
NITRITE UR QL STRIP: NEGATIVE
PCP UR QL SCN: NEGATIVE
PH UR STRIP: 6 [PH] (ref 5–9)
PLATELET # BLD: 232 10^3/UL (ref 150–450)
PROT UR STRIP-MCNC: NEGATIVE MG/DL
RBC # BLD AUTO: 3.88 10^6/UL (ref 3.72–5.28)
SP GR UR STRIP: 1.01
TOTAL CELLS COUNTED % (AUTO): 100 %
URINE AMPHETAMINES SCREEN: NEGATIVE
URINE BENZODIAZEPINES SCREEN: NEGATIVE
URINE COCAINE SCREEN: NEGATIVE
URINE MARIJUANA (THC) SCREEN: NEGATIVE
UROBILINOGEN UR-MCNC: NEGATIVE MG/DL (ref ?–2)
WBC # BLD AUTO: 8.7 10^3/UL (ref 4–10.5)

## 2020-01-30 PROCEDURE — 0KQM0ZZ REPAIR PERINEUM MUSCLE, OPEN APPROACH: ICD-10-PCS | Performed by: OBSTETRICS & GYNECOLOGY

## 2020-01-30 PROCEDURE — 0UQMXZZ REPAIR VULVA, EXTERNAL APPROACH: ICD-10-PCS | Performed by: OBSTETRICS & GYNECOLOGY

## 2020-01-30 RX ADMIN — IBUPROFEN SCH MG: 800 TABLET, FILM COATED ORAL at 22:16

## 2020-01-30 RX ADMIN — SODIUM CHLORIDE, SODIUM LACTATE, POTASSIUM CHLORIDE, AND CALCIUM CHLORIDE PRN MLS/HR: .6; .31; .03; .02 INJECTION, SOLUTION INTRAVENOUS at 09:52

## 2020-01-30 RX ADMIN — SODIUM CHLORIDE, SODIUM LACTATE, POTASSIUM CHLORIDE, AND CALCIUM CHLORIDE PRN MLS/HR: .6; .31; .03; .02 INJECTION, SOLUTION INTRAVENOUS at 00:45

## 2020-01-30 RX ADMIN — ACETAMINOPHEN AND CODEINE PHOSPHATE PRN EACH: 300; 30 TABLET ORAL at 20:40

## 2020-01-30 RX ADMIN — PENICILLIN G POTASSIUM SCH MLS/HR: 5000000 POWDER, FOR SOLUTION INTRAMUSCULAR; INTRAPLEURAL; INTRATHECAL; INTRAVENOUS at 09:53

## 2020-01-30 NOTE — ADMISSION PHYSICAL
=================================================================



=================================================================

Datetime Report Generated by CPN: 2020 04:15

   

   

=================================================================

CURRENT ADMISSION

=================================================================

   

Chief Complaint:  Uterine Contractions

Indication for Induction:  Not Applicable

Admit Impression :  Term, Intrauterine Pregnancy; No Active Labor;

   Ruptured Membranes

Admit Plan:  Admit to Unit; Initiate Labor Augmentation Protocol

   

=================================================================

ALLERGIES

=================================================================

   

Medication Allergies:  Yes

Medication Allergies:  Sulfa (Sulfonamide Antibiotics)/MO/Urticaria

   (2020)

Latex:  No Latex Allergies

   

=================================================================

OBSTETRICAL HISTORY

=================================================================

   

EDC:  2020 00:00

:  2

Para:  1

Term:  1

:  0

SAB:  0

Livin

Gestational Diabetes:  No

Rh Sensitization:  No

Incompetent Cervix:  No

ALESSIA:  No

Infertility:  No

ART Treatment:  No

Uterine Anomaly:  No

IUGR:  No

Hx Previous C/S:  No

Macrosomia:  No

Hx Loss/Stillborn:  No

PIH:  No

Hx  Death:  No

Placenta Previa/Abruption:  No

Depression/PP Depression:  No

PTL/PROM:  No

Post Partum Hemorrhage:  No

Current Pregnancy Procedures:  Ultrasound; NST

   

=================================================================

***SEE PRENATAL RECORDS***

=================================================================

   

Alcohol:  No

Marijuana :  No

Cocaine:  No

Other Illicit Drugs:  No

Cigarettes:  Never Smoker. [id number]

   

=================================================================

MEDICAL HISTORY

=================================================================

   

Diabetes:  No

Blood Transfusion:  No

Pulmonary Disease (Asthma, TB):  No

Breast Disease:  No

Hypertension:  No

Gyn Surgery:  No

Heart Disease:  No

Hosp/Surgery:  No

Autoimmune Disorder:  No

Anesthetic Complications:  No

Kidney Disease:  No

Abnormal Pap Smear:  No

Neuro/Epilepsy:  No

Psychiatric Disorders:  No

Other Medical Diseases:  No

Hepatitis/Liver Disease:  No

Significant Family History:  No

Varicosities/Phlebitis:  No

Thyroid Dysfunction:  No

   

=================================================================

INFECTIOUS HISTORY

=================================================================

   

Gonorrhea:  No

Genital Herpes:  No

Chlamydia:  No

Syphilis:  No

HIV/AIDS Exposure:  No

HPV:  No

   

=================================================================

PHYSICAL EXAM

=================================================================

   

General:  Normal

HEENT:  Normal

Neurologic:  Normal

Thyroid:  Normal

Heart:  Normal

Lungs:  Normal

Breast:  Normal

Back:  Normal

Abdomen:  Normal

Genitourinary Exam:  Normal

Extremities:  Normal

DTRs:  Normal

Pelvic Type:  Adequate

Vital Signs:  Reviewed; Within Normal Limits

   

=================================================================

VAGINAL EXAM

=================================================================

   

Dilatation:  1

Effacement:  50

Station:  -2

Contraction Comments:  irregular

   

=================================================================

MEMBRANES

=================================================================

   

Membranes:  Ruptured

Amniotic Fluid Color:  Clear

   

=================================================================

FETUS A

=================================================================

   

EGA:  39.6

Monitoring:  External US

FHR- Baseline:  150s

Variability:  Moderate 6-25bpm

Accelerations:  15X15

Decelerations:  None

FHR Category:  Category I

Admit Comment:   presents to L_D c/o leakage of fluid, which

   started at 2330.  Clear fluid noted.  GBS Neg.  She reports good

   fetal movements.  No co-morbidities.  She is antoine

   irregularly.  Will start some Pitocin.

   

=================================================================

PLANS FOR LABOR AND DELIVERY

=================================================================

   

Labor and Delivery:  None

Pain Management:  Epidural

Feeding Preference:  Both

Benefit of Breast Feed Discussed:  Yes

Circumcision:  Yes

   

=================================================================

INFORMED CONSENT

=================================================================

   

Signature:  Electronically signed by MD MARA Chang) on

   2020 at 04:15  with User ID: [email or web address]

## 2020-01-30 NOTE — WARNING SIGNS IN BABIES
=================================================================

VOD Warning Signs

=================================================================

Datetime Report Generated by [facility]: 01/30/2020 12:28

   

VOD#608 -Warning Signs in Babies:  Viewed with Parent(s)/Family   

   (01/30/2020 11:45:Laura Yang RN)

## 2020-01-31 LAB
ADD MANUAL DIFF: NO
BASOPHILS # BLD AUTO: 0 10^3/UL (ref 0–0.2)
BASOPHILS NFR BLD AUTO: 0.4 % (ref 0–2)
EOSINOPHIL # BLD AUTO: 0.3 10^3/UL (ref 0–0.6)
EOSINOPHIL NFR BLD AUTO: 2.8 % (ref 0–6)
ERYTHROCYTE [DISTWIDTH] IN BLOOD BY AUTOMATED COUNT: 14.8 % (ref 11.5–14)
HCT VFR BLD CALC: 31.7 % (ref 36–47)
HGB BLD-MCNC: 10.5 G/DL (ref 12–15.5)
LYMPHOCYTES # BLD AUTO: 1.4 10^3/UL (ref 0.5–4.7)
LYMPHOCYTES NFR BLD AUTO: 14.1 % (ref 13–45)
MCH RBC QN AUTO: 27.4 PG (ref 27–33.4)
MCHC RBC AUTO-ENTMCNC: 33 G/DL (ref 32–36)
MCV RBC AUTO: 83 FL (ref 80–97)
MONOCYTES # BLD AUTO: 0.7 10^3/UL (ref 0.1–1.4)
MONOCYTES NFR BLD AUTO: 7.1 % (ref 3–13)
NEUTROPHILS # BLD AUTO: 7.6 10^3/UL (ref 1.7–8.2)
NEUTS SEG NFR BLD AUTO: 75.6 % (ref 42–78)
PLATELET # BLD: 217 10^3/UL (ref 150–450)
RBC # BLD AUTO: 3.81 10^6/UL (ref 3.72–5.28)
TOTAL CELLS COUNTED % (AUTO): 100 %
WBC # BLD AUTO: 10.1 10^3/UL (ref 4–10.5)

## 2020-01-31 RX ADMIN — SENNOSIDES, DOCUSATE SODIUM SCH EACH: 50; 8.6 TABLET, FILM COATED ORAL at 10:17

## 2020-01-31 RX ADMIN — ACETAMINOPHEN AND CODEINE PHOSPHATE PRN EACH: 300; 30 TABLET ORAL at 10:20

## 2020-01-31 RX ADMIN — IBUPROFEN SCH MG: 800 TABLET, FILM COATED ORAL at 06:06

## 2020-01-31 RX ADMIN — FERROUS SULFATE TAB 325 MG (65 MG ELEMENTAL FE) SCH MG: 325 (65 FE) TAB at 10:17

## 2020-01-31 RX ADMIN — Medication SCH CAP: at 10:17

## 2020-01-31 RX ADMIN — IBUPROFEN SCH MG: 800 TABLET, FILM COATED ORAL at 22:48

## 2020-01-31 RX ADMIN — IBUPROFEN SCH MG: 800 TABLET, FILM COATED ORAL at 13:30

## 2020-01-31 RX ADMIN — DOCUSATE SODIUM SCH MG: 100 CAPSULE, LIQUID FILLED ORAL at 10:17

## 2020-01-31 NOTE — PDOC PROGRESS REPORT
Subjective-OB


Progress Note for:: 01/31/20


Subjective: 





Doing well, no c/o, hsb at BS, breastfeeding, no c/o





Physical Exam (OB)


Vital Signs: 


                                        











Temp Pulse Resp BP Pulse Ox


 


 98.2 F   65   17   108/61   98 


 


 01/31/20 07:50  01/31/20 07:50  01/31/20 07:50  01/31/20 07:50  01/31/20 07:50








                                 Intake & Output











 01/30/20 01/31/20 02/01/20





 06:59 06:59 06:59


 


Intake Total  1600 400


 


Balance  1600 400


 


Weight 97.976 kg  














- PIH/Pre-Eclampsia


Clonus: Negative


Headache: Absent


Epigastric Pain: No


Visual Changes: No





- Lochia


Lochia Amount: Small 10-25 ml


Lochia Color: Rubra/Red





- Abdomen


Description: Soft, Round


Hernia Present: No


Fundal Description: Firm, Midline


Fundal Height: u/u - u/2





Objective-Diagnostic


Laboratory: 


                                        





                                 01/31/20 10:06 





                                        











  01/31/20





  10:06


 


WBC  10.1


 


RBC  3.81


 


Hgb  10.5 L


 


Hct  31.7 L


 


MCV  83


 


MCH  27.4


 


MCHC  33.0


 


RDW  14.8 H


 


Plt Count  217


 


Seg Neutrophils %  75.6














Assessment and Plan(PN)





- Assessment and Plan


(1) Excessive weight gain in pregnancy


Qualifiers: 


   Trimester: first trimester   Qualified Code(s): O26.01 - Excessive weight 

gain in pregnancy, first trimester   


Is this a current diagnosis for this admission?: Yes   





(2) Positive GBS test


Is this a current diagnosis for this admission?: Yes   





(3) Delivery normal


Is this a current diagnosis for this admission?: Yes   





- Time Spent with Patient


Time with patient: Less than 15 minutes


Medications reviewed and adjusted accordingly: Yes





- Disposition


Anticipated Discharge: Home


Within: within 24 hours

## 2020-02-01 VITALS — DIASTOLIC BLOOD PRESSURE: 60 MMHG | SYSTOLIC BLOOD PRESSURE: 108 MMHG

## 2020-02-01 RX ADMIN — DOCUSATE SODIUM SCH MG: 100 CAPSULE, LIQUID FILLED ORAL at 09:18

## 2020-02-01 RX ADMIN — FERROUS SULFATE TAB 325 MG (65 MG ELEMENTAL FE) SCH: 325 (65 FE) TAB at 06:14

## 2020-02-01 RX ADMIN — ACETAMINOPHEN AND CODEINE PHOSPHATE PRN EACH: 300; 30 TABLET ORAL at 01:16

## 2020-02-01 RX ADMIN — Medication SCH CAP: at 09:18

## 2020-02-01 RX ADMIN — IBUPROFEN SCH: 800 TABLET, FILM COATED ORAL at 09:00

## 2020-02-01 RX ADMIN — FERROUS SULFATE TAB 325 MG (65 MG ELEMENTAL FE) SCH MG: 325 (65 FE) TAB at 09:18

## 2020-02-01 RX ADMIN — IBUPROFEN SCH MG: 800 TABLET, FILM COATED ORAL at 13:13

## 2020-02-01 RX ADMIN — IBUPROFEN SCH MG: 800 TABLET, FILM COATED ORAL at 06:51

## 2020-02-01 RX ADMIN — PENICILLIN G POTASSIUM SCH: 5000000 POWDER, FOR SOLUTION INTRAMUSCULAR; INTRAPLEURAL; INTRATHECAL; INTRAVENOUS at 08:56

## 2020-02-01 RX ADMIN — SENNOSIDES, DOCUSATE SODIUM SCH EACH: 50; 8.6 TABLET, FILM COATED ORAL at 09:18

## 2020-02-01 RX ADMIN — DOCUSATE SODIUM SCH: 100 CAPSULE, LIQUID FILLED ORAL at 06:14

## 2020-02-01 NOTE — PDOC DISCHARGE SUMMARY
Impression





- Admit/DC Date/PCP


Admission Date/Primary Care Provider: 


  01/30/20 00:40





  RUBÉN TERRAZAS





Discharge Date: 02/01/20





- Discharge Diagnosis


(1) Excessive weight gain in pregnancy


Is this a current diagnosis for this admission?: Yes   





(2) Positive GBS test


Is this a current diagnosis for this admission?: Yes   





(3) Delivery normal


Is this a current diagnosis for this admission?: Yes   





(4) Depression


Is this a current diagnosis for this admission?: Yes   





- Additional Information


Discharge Diet: As Tolerated, Regular


Discharge Activity: Activity As Tolerated, No Lifting Over 10 Pounds, No 

Lifting/Push/Pulling, Pelvic Rest


Referrals: 


[facility] [Provider Group] (a 1 week)


Prescriptions: 


Sertraline HCl [Zoloft 50 mg Tablet] 50 mg PO DAILY #30 tablet


Home Medications: 








Prenat 115/Iron Fum/Folic/Dss [Prenatal 19 Tablet] 1 each PO DAILY 01/30/20 


Sertraline HCl [Zoloft 50 mg Tablet] 50 mg PO DAILY #30 tablet 02/01/20 











HPI


Gestational Age: 39


Reason(s) for Admission: Onset of Labor, PROM, Group B Strep Positive


Admission Note: 





augmentation with Pitocin


Prenatal Procedures: NST, Ultrasound


Intrapartum Procedure(s): Spontaneous Vaginal Delivery


Postpartum Complication(s): Laceration-Perineal, Laceration-Periurethral - boy, 

wt 8-7, apgars 9/9


Laceration-Degree: 2nd





Hospital Course


Hospital Course: 


routine





Results


Laboratory Results: 


                                        











WBC  10.1 10^3/uL (4.0-10.5)   01/31/20  10:06    


 


RBC  3.81 10^6/uL (3.72-5.28)   01/31/20  10:06    


 


Hgb  10.5 g/dL (12.0-15.5)  L  01/31/20  10:06    


 


Hct  31.7 % (36.0-47.0)  L  01/31/20  10:06    


 


MCV  83 fl (80-97)   01/31/20  10:06    


 


MCH  27.4 pg (27.0-33.4)   01/31/20  10:06    


 


MCHC  33.0 g/dL (32.0-36.0)   01/31/20  10:06    


 


RDW  14.8 % (11.5-14.0)  H  01/31/20  10:06    


 


Plt Count  217 10^3/uL (150-450)   01/31/20  10:06    


 


Lymph % (Auto)  14.1 % (13-45)   01/31/20  10:06    


 


Mono % (Auto)  7.1 % (3-13)   01/31/20  10:06    


 


Eos % (Auto)  2.8 % (0-6)   01/31/20  10:06    


 


Baso % (Auto)  0.4 % (0-2)   01/31/20  10:06    


 


Absolute Neuts (auto)  7.6 10^3/uL (1.7-8.2)   01/31/20  10:06    


 


Absolute Lymphs (auto)  1.4 10^3/uL (0.5-4.7)   01/31/20  10:06    


 


Absolute Monos (auto)  0.7 10^3/uL (0.1-1.4)   01/31/20  10:06    


 


Absolute Eos (auto)  0.3 10^3/uL (0.0-0.6)   01/31/20  10:06    


 


Absolute Basos (auto)  0.0 10^3/uL (0.0-0.2)   01/31/20  10:06    


 


Seg Neutrophils %  75.6 % (42-78)   01/31/20  10:06    


 


Urine Color  STRAW   01/29/20  23:59    


 


Urine Appearance  CLEAR   01/29/20  23:59    


 


Urine pH  6.0  (5.0-9.0)   01/29/20  23:59    


 


Ur Specific Gravity  1.006   01/29/20  23:59    


 


Urine Protein  NEGATIVE mg/dL (NEGATIVE)   01/29/20  23:59    


 


Urine Glucose (UA)  NEGATIVE mg/dL (NEGATIVE)   01/29/20  23:59    


 


Urine Ketones  NEGATIVE mg/dL (NEGATIVE)   01/29/20  23:59    


 


Urine Blood  MODERATE  (NEGATIVE)  H  01/29/20  23:59    


 


Urine Nitrite  NEGATIVE  (NEGATIVE)   01/29/20  23:59    


 


Urine Bilirubin  NEGATIVE  (NEGATIVE)   01/29/20  23:59    


 


Urine Urobilinogen  NEGATIVE mg/dL (<2.0)   01/29/20  23:59    


 


Ur Leukocyte Esterase  NEGATIVE  (NEGATIVE)   01/29/20  23:59    


 


Urine Ascorbic Acid  NEGATIVE  (NEGATIVE)   01/29/20  23:59    


 


Fetal Membranes Rupture  POSITIVE  (NEGATIVE)  H  01/30/20  00:05    


 


Urine Opiates Screen  NEGATIVE   01/29/20  23:59    


 


Urine Methadone Screen  NEGATIVE   01/29/20  23:59    


 


Ur Barbiturates Screen  NEGATIVE   01/29/20  23:59    


 


Ur Phencyclidine Scrn  NEGATIVE   01/29/20  23:59    


 


Ur Amphetamines Screen  NEGATIVE   01/29/20  23:59    


 


U Benzodiazepines Scrn  NEGATIVE   01/29/20  23:59    


 


Urine Cocaine Screen  NEGATIVE   01/29/20  23:59    


 


U Marijuana (THC) Screen  NEGATIVE   01/29/20  23:59    


 


RPR  NONREACTIVE  (NONREACTIVE)   01/30/20  00:39    


 


Blood Type  A POSITIVE   01/30/20  00:39    


 


Antibody Screen  NEGATIVE   01/30/20  00:39    














Plan


Health Concerns: 


depression


Plan of Treatment: 


start Zoloft, hsb to look for signs depression is worse


Goals: 


no complications


Time Spent: Less than 30 Minutes

## 2020-02-01 NOTE — PDOC PROGRESS REPORT
Subjective-OB


Progress Note for:: 02/01/20


Subjective: 





pt sleeping on rounds with blanket over head, hsb states she is tired, discussed

with patient and she reports hx of pp depression and she feels sad today, has 

good support at home, breastfeeding, eating well, scant lochia





Physical Exam (OB)


Vital Signs: 


                                        











Temp Pulse Resp BP Pulse Ox


 


 97.7 F   66   18   108/60   100 


 


 02/01/20 10:21  02/01/20 10:21  02/01/20 10:21  02/01/20 10:21  02/01/20 10:21








                                 Intake & Output











 01/31/20 02/01/20 02/02/20





 06:59 06:59 06:59


 


Intake Total 1600 1400 2000


 


Balance 1600 1400 2000














- PIH/Pre-Eclampsia


Clonus: Negative


Headache: Absent


Epigastric Pain: No


Visual Changes: No





- Lochia


Lochia Amount: Scant < 10 ml


Lochia Color: Rubra/Red





- Abdomen


Description: Soft


Hernia Present: No


Fundal Description: Firm, Midline


Fundal Height: u/u - u/2





Objective-Diagnostic


Laboratory: 


                                        





                                 01/31/20 10:06 











Assessment and Plan(PN)





- Assessment and Plan


(1) Excessive weight gain in pregnancy


Qualifiers: 


   Trimester: first trimester   Qualified Code(s): O26.01 - Excessive weight 

gain in pregnancy, first trimester   


Is this a current diagnosis for this admission?: Yes   





(2) Positive GBS test


Is this a current diagnosis for this admission?: Yes   





(3) Delivery normal


Is this a current diagnosis for this admission?: Yes   





- Time Spent with Patient


Time with patient: Less than 15 minutes


Medications reviewed and adjusted accordingly: Yes





- Disposition


Anticipated Discharge: Home


Within: within 24 hours

## 2020-02-04 NOTE — DELIVERY SUMMARY
=================================================================

Del Sum A-C

=================================================================

Datetime Report Generated by CPN: 2020 13:56

   

   

=================================================================

DELIVERY PERSONNEL

=================================================================

   

DELIVERY PERSONNEL:  [id number]

Delivery Doctor::  Soraya Delaney CNM

Labor and Delivery Nurse::  Estephania Angulo RN

Labor and Delivery Nurse::  Laura Yang RN

Scrub Tech/CNA:  Herlinda Garcia, ST

   

=================================================================

MATERNAL INFORMATION

=================================================================

   

Delivery Anesthesia:  Epidural

Medications After Delivery:  Pitocin Drip 20 Units/1000ml NSS

Estimated  Blood Loss (ml):  440

Maternal Complications:  None

Provider Comments:  Called to pt room, pt was C/C/+2 with urge to push.

   Epidural working well, pt in good control, pushed several times baby

   was delivered OA to PAUL, shoulders delivered easily, infant vigorous

   to mothers abd.  Cord clamped after 2 min, placenta partially

    and bleeding, attempted to assist with spont delivery but

   manually removed via delaney mech d/t increased bleeding.  Pt

   tolerated well, lacerations repaired as above. Mother and baby

   stable and bleeding stabilized.

   

=================================================================

LABOR SUMMARY

=================================================================

   

EDC:  2020 00:00

No. Babies in Womb:  1

 Attempted:  No

Labor Anesthesia:  Epidural

   

=================================================================

LABOR INFORMATION

=================================================================

   

Reason for Induction:  Not Applicable

Complete Dilatation:  2020 11:05

Oxytocin:  Augmentation

Group B Beta Strep:  positive

Antibiotics # of Doses:  3

Antibiotics Time of Last Dose:  953

Name of Antibiotic Given:  Penicillin

Steroids Given:  None

Reason Steroids Not Administered:  Not Applicable

   

=================================================================

MEMBRANES

=================================================================

   

Membranes Rupture Method:  Spontaneous

Rupture of Membranes:  2020 23:30

Length of Rupture (hr):  11.85

Amniotic Fluid Color:  Clear

Amniotic Fluid Amount:  Moderate

Amniotic Fluid Odor:  Normal

   

=================================================================

STAGES OF LABOR

=================================================================

   

Stage 2 hr:  0

Stage 2 min:  16

Stage 3 hr:  0

Stage 3 min:  7

   

=================================================================

VAGINAL DELIVERY

=================================================================

   

Episiotomy:  None

Laceration #1:  Perineal

Laceration Extension #1:  Second Degree

Laceration #2:  Periurethral

Laceration Extension #2:  First Degree

Laceration Repair:  Yes

Laceration Repair Note:  3.0 chromic used for perineal repair in usual

   fashion

   periurethral lacs not repaired, superficial and hemostatic

Sponge Count Correct:  Yes

   

=================================================================

BABY A INFORMATION

=================================================================

   

Infant Delivery Date/Time:  2020 11:21

Method of Delivery:  Vaginal

Born in Route :  No

:  N/A

Forceps:  N/A

Vacuum Extraction:  N/A

Shoulder Dystocia :  No

   

=================================================================

PRESENTATION/POSITION BABY A

=================================================================

   

Presentation:  Cephalic

Cephalic Presentation:  Vertex

Vertex Position:  Left Occipital Anterior

Breech Presentation:  N/A

   

=================================================================

PLACENTA INFORMATION BABY A

=================================================================

   

Placenta Delivery Time :  2020 11:28

Placenta Method of Delivery:  Spontaneous

Placenta Status:  Delivered

   

=================================================================

APGAR SCORES BABY A

=================================================================

   

Heart Rate 1 min:  >100 bpm

Resp Effort 1 min:  Good Cry

Reflex Irritability 1 min:  Cough or Sneeze or Pulls Away

Muscle Tone 1 min:  Active Motion

Color 1 min:  Body Pink, Extremities Blue

APGAR SCORE 1 MIN:  9

Heart Rate 5 min:  >100 bpm

Resp Effort 5 min:  Good Cry

Reflex Irritability 5 min:  Cough or Sneeze or Pulls Away

Muscle Tone 5 min:  Active Motion

Color 5 min:  Body Pink, Extremities Blue

APGAR SCORE 5 MIN:  9

   

=================================================================

INFANT INFORMATION BABY A

=================================================================

   

Gestational Age at Delivery:  39.0

Gestational Status:  Full Term- 39- 40.6 Weeks

Infant Outcome :  Liveborn

Infant Condition :  Stable

Infant Sex:  Male

Infant Sex:  Male

   

=================================================================

IDENTIFICATION BABY A

=================================================================

   

Infant Verification Date/Time:  2020 11:31

ID Band Number:  [id number]

Mother's Name Verified:  Yes

Infant Medical Record Number:  [id number]

RN Verifying Infant:  MMobley MJorge

   

=================================================================

WEIGHT/LENGTH BABY A

=================================================================

   

Infant Birthweight (gm):  3836

Infant Weight (lb):  8

Infant Weight (oz):  7

Infant Length (in):  20.25

Infant Length (cm):  51.44

   

=================================================================

CORD INFORMATION BABY A

=================================================================

   

Nuchal Cord :  N/A

Infant Suction:  None

   

=================================================================

ASSESSMENT BABY A

=================================================================

   

Infant Complications:  None

Physical Findings at Delivery:  Caput Succedaneum; Molding of the Head

Infant Respirations:  Appears Normal

Infant Care By:  Sabina OTT

Transferred To:  Remains with Mother

   

=================================================================

BABY B INFORMATION

=================================================================

   

 :  N/A

   

=================================================================

SIGNATURES

=================================================================

   

Assignment:  James Infante MD

Signature:  Electronically signed by Soraya Delaney CNM on 2020 at

   11:50  with User ID: [email or web address]

:  Electronically signed by Soraya Delaney CNM on 2020 at 11:50 

   with User ID: [email or web address]

:  I was personally available for consultation and serving as

   supervising physician for the P.